# Patient Record
Sex: MALE | Race: WHITE | NOT HISPANIC OR LATINO | URBAN - METROPOLITAN AREA
[De-identification: names, ages, dates, MRNs, and addresses within clinical notes are randomized per-mention and may not be internally consistent; named-entity substitution may affect disease eponyms.]

---

## 2019-12-28 ENCOUNTER — EMERGENCY (EMERGENCY)
Facility: HOSPITAL | Age: 59
LOS: 1 days | Discharge: HOME | End: 2019-12-28
Attending: EMERGENCY MEDICINE | Admitting: EMERGENCY MEDICINE
Payer: COMMERCIAL

## 2019-12-28 VITALS
SYSTOLIC BLOOD PRESSURE: 154 MMHG | RESPIRATION RATE: 16 BRPM | TEMPERATURE: 98 F | HEIGHT: 70 IN | OXYGEN SATURATION: 99 % | HEART RATE: 99 BPM | DIASTOLIC BLOOD PRESSURE: 84 MMHG | WEIGHT: 191.8 LBS

## 2019-12-28 VITALS
HEART RATE: 79 BPM | DIASTOLIC BLOOD PRESSURE: 78 MMHG | OXYGEN SATURATION: 97 % | SYSTOLIC BLOOD PRESSURE: 119 MMHG | TEMPERATURE: 98 F | RESPIRATION RATE: 17 BRPM

## 2019-12-28 PROCEDURE — 73630 X-RAY EXAM OF FOOT: CPT | Mod: 26,LT

## 2019-12-28 PROCEDURE — 99283 EMERGENCY DEPT VISIT LOW MDM: CPT | Mod: 25

## 2019-12-28 RX ADMIN — Medication 100 MILLIGRAM(S): at 13:55

## 2019-12-28 NOTE — ED PROVIDER NOTE - DIAGNOSTIC INTERPRETATION
Interpreted by ED physician Rosita YANEZ  foot x-ray, 3 views  No fracture, no dislocation (joint spaces grossly normal), no Foreign Body noted, punctate area of sts anterior base of foot

## 2019-12-28 NOTE — ED PROVIDER NOTE - PATIENT PORTAL LINK FT
You can access the FollowMyHealth Patient Portal offered by [facility] by registering at the following website: [email or web address]. By joining [facility]’s FollowMyHealth portal, you will also be able to view your health information using other applications (apps) compatible with our system.

## 2019-12-28 NOTE — ED ADULT NURSE NOTE - NSIMPLEMENTINTERV_GEN_ALL_ED
Implemented All Universal Safety Interventions:  [place] to call system. Call bell, personal items and telephone within reach. Instruct patient to call for assistance. Room bathroom lighting operational. Non-slip footwear when patient is off stretcher. Physically safe environment: no spills, clutter or unnecessary equipment. Stretcher in lowest position, wheels locked, appropriate side rails in place.

## 2019-12-28 NOTE — ED PROVIDER NOTE - CLINICAL SUMMARY MEDICAL DECISION MAKING FREE TEXT BOX
60 y/o M presenting with L foot pain. On exam, Pt has a 2mm punctate with minimal erythema. Will order XR imaging to check for foreign objects. Will reassess afterwards. 60 y/o M presenting with L foot pain. On exam, Pt has a 2mm punctate with minimal erythema. Will order XR imaging to check for foreign objects. Will reassess afterwards.    wound I& d with small amount of purulent drainage   abx   care instructions

## 2019-12-28 NOTE — ED PROVIDER NOTE - NSFOLLOWUPINSTRUCTIONS_ED_ALL_ED_FT
take medications as directed    keep wound clean and dry    packing requires removal in 48 hours - return to ER or with your doctor     follow up with podiatry if symptoms worsen     return to ER if area becomes red swollen tender or for any other concern

## 2019-12-28 NOTE — ED PROVIDER NOTE - OBJECTIVE STATEMENT
Triage Chief complaint: foot pain/injury  Nurse triage notes: patient walk in c/o possible piece of glass in bottom of left foot; stepped on glass 2 weeks ago  Individuals Present- Dr. Becker (ED Attending), Nicolas Lewis (Scribe), Pt  Vital Signs: HR 95, /74, Resp 16/min, Temp 98.2(F), SpO2 100%    58 y/o M with no PMHx presents to the ED for L foot pain. Pt reports he broke some glass at home about 2 weeks ago. Since the incident, he has had pain in the L foot with walking. Pt came to the ED today with concern for a possible fragment of glass in the bottom of his L foot. He denies fevers and chills.

## 2020-01-10 DIAGNOSIS — M79.672 PAIN IN LEFT FOOT: ICD-10-CM

## 2020-01-10 DIAGNOSIS — L02.612 CUTANEOUS ABSCESS OF LEFT FOOT: ICD-10-CM

## 2021-08-30 NOTE — ED ADULT NURSE NOTE - PRIMARY CARE PROVIDER
Call Center TCM Note      Responses   [facility] patient discharged from?  June   Does the patient have one of the following disease processes/diagnoses(primary or secondary)?  Other   TCM attempt successful?  No [Will resolve as patient completed TCM f/u 8/30/21]   Unsuccessful attempts  Attempt 1          Katya Osei RN    8/30/2021, 10:11 EDT       No PCP